# Patient Record
Sex: MALE | Race: WHITE | URBAN - METROPOLITAN AREA
[De-identification: names, ages, dates, MRNs, and addresses within clinical notes are randomized per-mention and may not be internally consistent; named-entity substitution may affect disease eponyms.]

---

## 2017-08-02 ENCOUNTER — THERAPY VISIT (OUTPATIENT)
Dept: CHIROPRACTIC MEDICINE | Facility: CLINIC | Age: 22
End: 2017-08-02
Payer: COMMERCIAL

## 2017-08-02 DIAGNOSIS — M79.10 MYALGIA: ICD-10-CM

## 2017-08-02 DIAGNOSIS — G89.29 CHRONIC LOW BACK PAIN WITHOUT SCIATICA, UNSPECIFIED BACK PAIN LATERALITY: ICD-10-CM

## 2017-08-02 DIAGNOSIS — M54.50 CHRONIC LOW BACK PAIN WITHOUT SCIATICA, UNSPECIFIED BACK PAIN LATERALITY: ICD-10-CM

## 2017-08-02 DIAGNOSIS — M99.05 SOMATIC DYSFUNCTION OF PELVIS REGION: Primary | ICD-10-CM

## 2017-08-02 PROCEDURE — 98940 CHIROPRACT MANJ 1-2 REGIONS: CPT | Mod: AT | Performed by: CHIROPRACTOR

## 2017-08-02 PROCEDURE — 99212 OFFICE O/P EST SF 10 MIN: CPT | Mod: 25 | Performed by: CHIROPRACTOR

## 2017-08-02 PROCEDURE — 97110 THERAPEUTIC EXERCISES: CPT | Performed by: CHIROPRACTOR

## 2017-08-07 PROBLEM — M54.50 CHRONIC LOW BACK PAIN WITHOUT SCIATICA, UNSPECIFIED BACK PAIN LATERALITY: Status: ACTIVE | Noted: 2017-08-07

## 2017-08-07 PROBLEM — M99.05 SOMATIC DYSFUNCTION OF PELVIS REGION: Status: ACTIVE | Noted: 2017-08-07

## 2017-08-07 PROBLEM — M79.10 MYALGIA: Status: ACTIVE | Noted: 2017-08-07

## 2017-08-07 PROBLEM — G89.29 CHRONIC LOW BACK PAIN WITHOUT SCIATICA, UNSPECIFIED BACK PAIN LATERALITY: Status: ACTIVE | Noted: 2017-08-07

## 2017-08-07 NOTE — PROGRESS NOTES
[facility]  Aug 2, 2017    Subjective:  Daniel Sullivan   21 year old   male    CC: chronic lower back pain, previous patient who has responded well  Medications reviewed: Denies  Visit: 1/6  Goal: sit for 60 minutes without any discomfort, jump and play basketball with no pain  Location: general lower back, no specific pain  MEGAN: Denies any specific MEGAN, notes gradual onset playing basketball about 1 month ago  Pain: varies but 4/10 on average  Previous History: Treated his back in past and he responded well  Progression: not changing over last couple weeks  Quality: ache and tightness   Radiation: Denies at any time  Pain is worse with: jumping, sitting for long periods  Pain is better with: rest, stretching  Timing: frequent pain  Under care: has not seen anyone else for this  Imaging: Denies  Social: alert, oriented, and active    Objective:  Inspection:  No SDD  No Scars  Normal Gait    Palpation:  No specific pain  Palpable soreness over general lower and mid back  Myofascitis 2/4 noted over LPS, B Psoas, B hip ER    ROM:  Lumbar flexion   90/90, tightness over lower back  Lumbar extension  30/30, mild discomfort over lower back  Right Hip IR  28/45  Left Hip IR  34/45  Right Hip ER  48/60  Left  hip ER  54/60  SLR symmetric and pain free    MMT:  Left glute med 4/5 with no pain  Right glute med 4/5 with no pain  Left TFL 5/5 with no pain  Right TFL 5/5 with no pain  Left piriformis 5/5 with no pain  Right piriformis 5/5 with no pain    MET:  Right short leg    Squat:  Shift to right  Foot flare to right  Arm drop on right    Lunge:  Right knee genu valgum  Right knee cross over     NAL:  Restricted SI on right side    Neuro:  Able to toe walk and heel walk  L4-S1 light touch WNL  MMT L4, L5, S1 5/5     Ortho:  SLR (-), kemps (mild lower back discomfort on right)    Assessment:  NAL with associated myofascitis and weakness  Chronic lumbago    Plan:   Decrease pain 50%    Restore  symmetric hip IR   Restore symmetric hip ER   Strengthen hip stabilizers to 5/5 B   Restore pelvic leveling   Restore segmental motion   Functional goals in history    Patient was given detailed history, review of symptoms, examination, functional examination, and report of findings. After this patient was treated with chiropractic adjustments, manual therapy, and therapeutic exercise. Patient tolerated treatment well. Patients treatment plan with be 2 times per week for 2 weeks followed by 1 time per week for 2 weeks. Following treatment plan a follow up exam will be done to make sure patient is improving. Treatment frequency will degrease as patients subjective complaints improve as well as objective findings. Prognosis for care is good based on fact that patient is active and is willing to take active approach in care.     Patient tolerated treatment well today  Treatment Time: 45 minutes  62698 manipulation 1-2 segments: MET, Hip LAD, Manual adjustment   27186 Manual therapy: (ART, Graston, Strain Counter Strain, Fascial Manipulation, Cupping) performed over area of LPS, B hip ER's, B psoas  50691 therapeutic exercise (30 minutes): bird dogs on stability ball, side lying hip rotation  Strapping: hip lateral glide B  Taping:  Next visit: 1 week  Other:      Estuardo Akers DC, MEd, ATC

## 2017-08-21 ENCOUNTER — THERAPY VISIT (OUTPATIENT)
Dept: CHIROPRACTIC MEDICINE | Facility: CLINIC | Age: 22
End: 2017-08-21
Payer: COMMERCIAL

## 2017-08-21 DIAGNOSIS — G89.29 CHRONIC LOW BACK PAIN WITHOUT SCIATICA, UNSPECIFIED BACK PAIN LATERALITY: ICD-10-CM

## 2017-08-21 DIAGNOSIS — M54.50 CHRONIC LOW BACK PAIN WITHOUT SCIATICA, UNSPECIFIED BACK PAIN LATERALITY: ICD-10-CM

## 2017-08-21 DIAGNOSIS — M79.10 MYALGIA: ICD-10-CM

## 2017-08-21 DIAGNOSIS — M99.05 SOMATIC DYSFUNCTION OF PELVIS REGION: Primary | ICD-10-CM

## 2017-08-21 PROCEDURE — 97110 THERAPEUTIC EXERCISES: CPT | Performed by: CHIROPRACTOR

## 2017-08-21 PROCEDURE — 98940 CHIROPRACT MANJ 1-2 REGIONS: CPT | Mod: AT | Performed by: CHIROPRACTOR

## 2017-08-21 NOTE — MR AVS SNAPSHOT
"              After Visit Summary   2017    Daniel Sullivan    MRN: [id number]           Patient Information     Date Of Birth          1995        Visit Information        Provider Department      2017 4:45 PM Estuardo Akers DC [facility] Chiropractor        Today's Diagnoses     Somatic dysfunction of pelvis region    -  1    Chronic low back pain without sciatica, unspecified back pain laterality        Myalgia           Follow-ups after your visit        Who to contact     If you have questions or need follow up information about today's clinic visit or your schedule please contact [facility] directly at [phone].  Normal or non-critical lab and imaging results will be communicated to you by Misohart, letter or phone within 4 business days after the clinic has received the results. If you do not hear from us within 7 days, please contact the clinic through Misohart or phone. If you have a critical or abnormal lab result, we will notify you by phone as soon as possible.  Submit refill requests through [employer] or call your pharmacy and they will forward the refill request to us. Please allow 3 business days for your refill to be completed.          Additional Information About Your Visit        MyChart Information     [employer] lets you send messages to your doctor, view your test results, renew your prescriptions, schedule appointments and more. To sign up, go to [email or web address] . Click on \"Log in\" on the left side of the screen, which will take you to the Welcome page. Then click on \"Sign up Now\" on the right side of the page.     You will be asked to enter the access code listed below, as well as some personal information. Please follow the directions to create your username and password.     Your access code is: [email or web address]  Expires: 2017  5:46 PM     Your access code will  in 90 days. If " you need help or a new code, please call your [facility] clinic or [phone].        Care EveryWhere ID     This is your Care EveryWhere ID. This could be used by other organizations to access your [facility] medical records  [id number]         Blood Pressure from Last 3 Encounters:   No data found for BP    Weight from Last 3 Encounters:   No data found for Wt              We Performed the Following     CHIROPRAC MANIP,SPINAL,1-2 REGIONS     THERAPEUTIC EXERCISES        Primary Care Provider    None Specified       No primary provider on file.        Equal Access to Services     JAMES [facility] : Hadii aad ku hadasho Soomaali, waaxda luqadaha, qaybta kaalmada adeegyada, waxay floresin jakub oleary . So [facility] [phone].    ATENCIÓN: Si habla español, tiene a cohen disposición servicios gratuitos de asistencia lingüística. Llame al [phone].    We comply with applicable federal civil rights laws and [place] laws. We do not discriminate on the basis of race, color, national origin, age, disability sex, sexual orientation or gender identity.            Thank you!     Thank you for choosing [facility]  for your care. Our goal is always to provide you with excellent care. Hearing back from our patients is one way we can continue to improve our services. Please take a few minutes to complete the written survey that you may receive in the mail after your visit with us. Thank you!             Your Updated Medication List - Protect others around you: Learn how to safely use, store and throw away your medicines at www.disposemymeds.org.      Notice  As of 8/21/2017  5:46 PM    You have not been prescribed any medications.

## 2017-08-21 NOTE — PROGRESS NOTES
[facility]  Aug 2, 2017    Subjective:  Daniel Sullivan   21 year old   male    CC: chronic lower back pain, previous patient who has responded well  Medications reviewed: Denies  Visit: 2/6  Goal: sit for 60 minutes without any discomfort, jump and play basketball with no pain  Location: general lower back, no specific pain  MEGAN: Denies any specific MEGAN, notes gradual onset playing basketball about 1 month ago  Pain: varies but 4/10 on average  Comes in today doing well. Tolerated last session well. Was  at big golf tournament and because of that was a little sore over lower back. Overall notes improvement from last session. Most discomfort over right lower back. Denies any new issues.     Objective:  Inspection:  No SDD  No Scars  Normal Gait    Palpation:  No specific pain  Palpable soreness over general lower and mid back  Myofascitis 2/4 noted over LPS, B Psoas, B hip ER    ROM:  Lumbar flexion   90/90, tightness over lower back  Lumbar extension  30/30, mild discomfort over lower back  Right Hip IR  28/45  Left Hip IR  34/45  Right Hip ER  48/60  Left  hip ER  54/60  SLR symmetric and pain free    MMT:  Left glute med 4/5 with no pain  Right glute med 4/5 with no pain  Left TFL 5/5 with no pain  Right TFL 5/5 with no pain  Left piriformis 5/5 with no pain  Right piriformis 5/5 with no pain    MET:  Right short leg    Squat:  Shift to right  Foot flare to right  Arm drop on right    Lunge:  Right knee genu valgum  Right knee cross over     NAL:  Restricted SI on right side    Assessment:  NAL with associated myofascitis and weakness  Chronic lumbago    Plan:Patient tolerated treatment well today  Treatment Time: 45 minutes  99538 manipulation 1-2 segments: MET, Hip LAD, Manual adjustment   22490 Manual therapy: (ART, Graston, Strain Counter Strain, Fascial Manipulation, Cupping) performed over area of LPS, B hip ER's, B psoas  80505 therapeutic exercise (30 minutes): bird dogs on  stability ball, side lying hip rotation  Strapping: hip lateral glide B  Taping:  Next visit: 1 week  Other:      Estuardo Akers DC, MEd, ATC

## 2017-08-31 ENCOUNTER — THERAPY VISIT (OUTPATIENT)
Dept: CHIROPRACTIC MEDICINE | Facility: CLINIC | Age: 22
End: 2017-08-31
Payer: COMMERCIAL

## 2017-08-31 DIAGNOSIS — G89.29 CHRONIC LOW BACK PAIN WITHOUT SCIATICA, UNSPECIFIED BACK PAIN LATERALITY: ICD-10-CM

## 2017-08-31 DIAGNOSIS — M99.05 SOMATIC DYSFUNCTION OF PELVIS REGION: Primary | ICD-10-CM

## 2017-08-31 DIAGNOSIS — M79.10 MYALGIA: ICD-10-CM

## 2017-08-31 DIAGNOSIS — M54.50 CHRONIC LOW BACK PAIN WITHOUT SCIATICA, UNSPECIFIED BACK PAIN LATERALITY: ICD-10-CM

## 2017-08-31 PROCEDURE — 97110 THERAPEUTIC EXERCISES: CPT | Performed by: CHIROPRACTOR

## 2017-08-31 PROCEDURE — 98940 CHIROPRACT MANJ 1-2 REGIONS: CPT | Mod: AT | Performed by: CHIROPRACTOR

## 2017-08-31 NOTE — MR AVS SNAPSHOT
"              After Visit Summary   2017    Daniel Sullivan    MRN: [id number]           Patient Information     Date Of Birth          1995        Visit Information        Provider Department      2017 10:00 AM Estuardo Akers DC [facility] Athletic [facility] Chiropractor        Today's Diagnoses     Somatic dysfunction of pelvis region    -  1    Chronic low back pain without sciatica, unspecified back pain laterality        Myalgia           Follow-ups after your visit        Who to contact     If you have questions or need follow up information about today's clinic visit or your schedule please contact [facility]IC [facility] CHIROPRACTOR directly at [phone].  Normal or non-critical lab and imaging results will be communicated to you by Talentology[employer], letter or phone within 4 business days after the clinic has received the results. If you do not hear from us within 7 days, please contact the clinic through [employer]ent[employer] or phone. If you have a critical or abnormal lab result, we will notify you by phone as soon as possible.  Submit refill requests through [employer] or call your pharmacy and they will forward the refill request to us. Please allow 3 business days for your refill to be completed.          Additional Information About Your Visit        MyChart Information     [employer] lets you send messages to your doctor, view your test results, renew your prescriptions, schedule appointments and more. To sign up, go to [email or web address] . Click on \"Log in\" on the left side of the screen, which will take you to the Welcome page. Then click on \"Sign up Now\" on the right side of the page.     You will be asked to enter the access code listed below, as well as some personal information. Please follow the directions to create your username and password.     Your access code is: [email or web address]  Expires: 2017  5:46 PM     Your access code will  in 90 days. " If you need help or a new code, please call your [facility] clinic or [phone].        Care EveryWhere ID     This is your Care EveryWhere ID. This could be used by other organizations to access your [facility] medical records  [id number]         Blood Pressure from Last 3 Encounters:   No data found for BP    Weight from Last 3 Encounters:   No data found for Wt              We Performed the Following     CHIROPRAC MANIP,SPINAL,1-2 REGIONS     THERAPEUTIC EXERCISES        Primary Care Provider    None Specified       No primary provider on file.        Equal Access to Services     JAMES [facility] : Hadii aad ku hadasho Soomaali, waaxda luqadaha, qaybta kaalmada adeegyada, waxay floresin haydayanan michael oleary . So [facility] [phone].    ATENCIÓN: Si habla español, tiene a cohen disposición servicios gratuitos de asistencia lingüística. Llame al [phone].    We comply with applicable federal civil rights laws and [place] laws. We do not discriminate on the basis of race, color, national origin, age, disability sex, sexual orientation or gender identity.            Thank you!     Thank you for choosing [facility] CHIROPRACTOR  for your care. Our goal is always to provide you with excellent care. Hearing back from our patients is one way we can continue to improve our services. Please take a few minutes to complete the written survey that you may receive in the mail after your visit with us. Thank you!             Your Updated Medication List - Protect others around you: Learn how to safely use, store and throw away your medicines at www.disposemymeds.org.      Notice  As of 8/31/2017  5:43 PM    You have not been prescribed any medications.

## 2017-08-31 NOTE — PROGRESS NOTES
[facility]  Aug 2, 2017    Subjective:  Daniel Sullivan   21 year old   male    CC: chronic lower back pain, previous patient who has responded well  Medications reviewed: Denies  Visit: 3/6  Goal: sit for 60 minutes without any discomfort, jump and play basketball with no pain  Location: general lower back, no specific pain  MEGAN: Denies any specific MEGAN, notes gradual onset playing basketball about 1 month ago  Pain: varies but 4/10 on average  Comes in today doing well. Tolerated last session well. Notes doing better. Still has some left lower back tightness. Notes last day as  was today so should be feeling much better. He is going back to school tomorrow. Denies any new issues and is pleased with progress.     Objective:  Inspection:  No SDD  No Scars  Normal Gait    Palpation:  No specific pain  Palpable soreness over general lower and mid back  Myofascitis 2/4 noted over LPS, B Psoas, B hip ER    ROM:  Lumbar flexion   90/90, tightness over lower back  Lumbar extension  30/30, mild discomfort over lower back  Right Hip IR  28/45  Left Hip IR  34/45  Right Hip ER  48/60  Left  hip ER  54/60  SLR symmetric and pain free    MMT:  Left glute med 4/5 with no pain  Right glute med 4/5 with no pain  Left TFL 5/5 with no pain  Right TFL 5/5 with no pain  Left piriformis 5/5 with no pain  Right piriformis 5/5 with no pain    MET:  Right short leg    Squat:  Shift to right  Foot flare to right  Arm drop on right    Lunge:  Right knee genu valgum  Right knee cross over     NAL:  Restricted SI on right side    Assessment:  NAL with associated myofascitis and weakness  Chronic lumbago    Plan:Patient tolerated treatment well today  Treatment Time: 45 minutes  83602 manipulation 1-2 segments: MET, Hip LAD, Manual adjustment   29265 Manual therapy: (ART, Graston, Strain Counter Strain, Fascial Manipulation, Cupping) performed over area of LPS, B hip ER's, B psoas  42257 therapeutic exercise (30  minutes): bird dogs on stability ball, side lying hip rotation, standing QL stretch  Strapping: hip lateral glide B  Taping:  Next visit: PRN as returning to school   Other:      Estuardo Akers DC, MEd, ATC